# Patient Record
Sex: FEMALE | Race: WHITE | Employment: FULL TIME | URBAN - METROPOLITAN AREA
[De-identification: names, ages, dates, MRNs, and addresses within clinical notes are randomized per-mention and may not be internally consistent; named-entity substitution may affect disease eponyms.]

---

## 2023-01-19 ENCOUNTER — OFFICE VISIT (OUTPATIENT)
Dept: PRIMARY CARE CLINIC | Age: 33
End: 2023-01-19
Payer: COMMERCIAL

## 2023-01-19 VITALS
DIASTOLIC BLOOD PRESSURE: 72 MMHG | TEMPERATURE: 97.7 F | HEART RATE: 105 BPM | RESPIRATION RATE: 12 BRPM | OXYGEN SATURATION: 95 % | BODY MASS INDEX: 20.83 KG/M2 | WEIGHT: 113.2 LBS | HEIGHT: 62 IN | SYSTOLIC BLOOD PRESSURE: 100 MMHG

## 2023-01-19 DIAGNOSIS — G43.701 CHRONIC MIGRAINE WITHOUT AURA WITH STATUS MIGRAINOSUS, NOT INTRACTABLE: Primary | ICD-10-CM

## 2023-01-19 PROCEDURE — 99204 OFFICE O/P NEW MOD 45 MIN: CPT | Performed by: FAMILY MEDICINE

## 2023-01-19 RX ORDER — SUMATRIPTAN 100 MG/1
TABLET, FILM COATED ORAL
Qty: 9 TABLET | Refills: 3 | Status: SHIPPED | OUTPATIENT
Start: 2023-01-19

## 2023-01-19 RX ORDER — DULOXETIN HYDROCHLORIDE 30 MG/1
CAPSULE, DELAYED RELEASE ORAL
COMMUNITY
Start: 2022-11-08 | End: 2023-01-19 | Stop reason: SDUPTHER

## 2023-01-19 RX ORDER — SUMATRIPTAN 100 MG/1
TABLET, FILM COATED ORAL
COMMUNITY
Start: 2022-12-30 | End: 2023-01-19 | Stop reason: SDUPTHER

## 2023-01-19 RX ORDER — INDOMETHACIN 50 MG/1
CAPSULE ORAL
Qty: 30 CAPSULE | Refills: 0 | Status: SHIPPED | OUTPATIENT
Start: 2023-01-19

## 2023-01-19 RX ORDER — DULOXETIN HYDROCHLORIDE 30 MG/1
30 CAPSULE, DELAYED RELEASE ORAL DAILY
Qty: 90 CAPSULE | Refills: 1 | Status: SHIPPED | OUTPATIENT
Start: 2023-01-19

## 2023-01-19 RX ORDER — TOPIRAMATE 100 MG/1
TABLET, FILM COATED ORAL
Qty: 180 TABLET | Refills: 1 | Status: SHIPPED | OUTPATIENT
Start: 2023-01-19

## 2023-01-19 RX ORDER — TOPIRAMATE 100 MG/1
TABLET, FILM COATED ORAL
COMMUNITY
Start: 2022-12-23 | End: 2023-01-19 | Stop reason: SDUPTHER

## 2023-01-19 SDOH — ECONOMIC STABILITY: FOOD INSECURITY: WITHIN THE PAST 12 MONTHS, YOU WORRIED THAT YOUR FOOD WOULD RUN OUT BEFORE YOU GOT MONEY TO BUY MORE.: NEVER TRUE

## 2023-01-19 SDOH — ECONOMIC STABILITY: FOOD INSECURITY: WITHIN THE PAST 12 MONTHS, THE FOOD YOU BOUGHT JUST DIDN'T LAST AND YOU DIDN'T HAVE MONEY TO GET MORE.: NEVER TRUE

## 2023-01-19 ASSESSMENT — PATIENT HEALTH QUESTIONNAIRE - PHQ9
1. LITTLE INTEREST OR PLEASURE IN DOING THINGS: 0
SUM OF ALL RESPONSES TO PHQ QUESTIONS 1-9: 0
SUM OF ALL RESPONSES TO PHQ9 QUESTIONS 1 & 2: 0
SUM OF ALL RESPONSES TO PHQ QUESTIONS 1-9: 0
2. FEELING DOWN, DEPRESSED OR HOPELESS: 0
SUM OF ALL RESPONSES TO PHQ QUESTIONS 1-9: 0
SUM OF ALL RESPONSES TO PHQ QUESTIONS 1-9: 0

## 2023-01-19 ASSESSMENT — SOCIAL DETERMINANTS OF HEALTH (SDOH): HOW HARD IS IT FOR YOU TO PAY FOR THE VERY BASICS LIKE FOOD, HOUSING, MEDICAL CARE, AND HEATING?: NOT HARD AT ALL

## 2023-01-19 NOTE — PATIENT INSTRUCTIONS
Emgality is the name of the preventative to discuss with neurology      Indocin was sent to your pharmacy to use if you have a severe lasting headache

## 2023-01-19 NOTE — PROGRESS NOTES
PROGRESS NOTE  Date of Service:  1/19/2023    SUBJECTIVE:  Patient ID: Laz Mao is a 34 y.o. female    HPI:     Migraine- approx 13-14 years ago began having headaches  Has been on current medication regimen for past 10 years  With change had side effects or issues  This regimen has worked best over others but continues to have daily headache  Saw neurology many years ago' no injectiblee medications    Has made diary, supportive measures  Multiple er visits over the years      No aura   But notes sensation that severe headache will occur that day   Located at side behind eye  + nausea  Light can aggravate more than sound    Can have headache for 3 days  Imitrex helps but does not always resolve        Past Surgical History:   Procedure Laterality Date    DENTAL SURGERY      WISDOM TOOTH EXTRACTION        Social History     Tobacco Use    Smoking status: Never    Smokeless tobacco: Never   Substance Use Topics    Alcohol use: Never      Family History   Problem Relation Age of Onset    Other Mother     No Known Problems Father     No Known Problems Brother      No current outpatient medications on file prior to visit. No current facility-administered medications on file prior to visit. No Known Allergies         OBJECTIVE:  Vitals:    01/19/23 1538   BP: 100/72   Site: Right Upper Arm   Position: Sitting   Cuff Size: Medium Adult   Pulse: (!) 105   Resp: 12   Temp: 97.7 °F (36.5 °C)   TempSrc: Temporal   SpO2: 95%   Weight: 113 lb 3.2 oz (51.3 kg)   Height: 5' 2\" (1.575 m)      Body mass index is 20.7 kg/m². Physical Exam  Constitutional:       Appearance: Normal appearance. HENT:      Head: Normocephalic and atraumatic. Eyes:      General: No scleral icterus. Extraocular Movements: Extraocular movements intact. Conjunctiva/sclera: Conjunctivae normal.      Pupils: Pupils are equal, round, and reactive to light.    Cardiovascular:      Rate and Rhythm: Normal rate and regular rhythm. Heart sounds: Normal heart sounds. Neurological:      General: No focal deficit present. Mental Status: She is alert and oriented to person, place, and time. Cranial Nerves: No cranial nerve deficit. Motor: No weakness. Psychiatric:         Attention and Perception: Attention and perception normal.         Mood and Affect: Mood and affect normal.         Speech: Speech normal.         Behavior: Behavior normal. Behavior is cooperative. Thought Content: Thought content normal.         Cognition and Memory: Cognition and memory normal.         Judgment: Judgment normal.       ASSESSMENT:  1. Chronic migraine without aura with status migrainosus, not intractable         PLAN:   1. Chronic migraine without aura with status migrainosus, not intractable  -     SUMAtriptan (IMITREX) 100 MG tablet; Take 1 tablet by mouth At onset of headache. May repeat 2 hours later. Max 200 mg in 24 hours, Disp-9 tablet, R-3Normal  -     DULoxetine (CYMBALTA) 30 MG extended release capsule; Take 1 capsule by mouth daily, Disp-90 capsule, R-1Normal  -     topiramate (TOPAMAX) 100 MG tablet; TAKE 1 TABLET BY MOUTH TWICE A DAY, Disp-180 tablet, R-1Normal  -     [facility] - Laila Reinoso DO, Neurology ([facility], [facility]), [place]   Discussed other medication options   Do recommend consider change as does not have current control  Rx sent today without change - however if it will be a long time before appointment with neuro could consider other options  to try then   Indocin for intractable headache          Approximately 45 minutes of time were spent in preparation, direct patient contact, care coordination, documentation and activities otherwise related to this encounter. Electronically signed by Francisco Maynard MD on 1/19/2023 at 3:39 PM.     Please note this chart was generated using dragon dictation software.   Although every effort was made to ensure the accuracy of this automated transcription, some errors in transcription may have occurred.

## 2023-12-27 DIAGNOSIS — G43.701 CHRONIC MIGRAINE WITHOUT AURA WITH STATUS MIGRAINOSUS, NOT INTRACTABLE: ICD-10-CM

## 2023-12-27 RX ORDER — DULOXETIN HYDROCHLORIDE 30 MG/1
90 CAPSULE, DELAYED RELEASE ORAL DAILY
Qty: 270 CAPSULE | Refills: 1 | OUTPATIENT
Start: 2023-12-27

## 2023-12-27 NOTE — TELEPHONE ENCOUNTER
Patient is due for a follow up appointment. Please call to schedule  Please let me know if they need a short rx to get them to their appt.

## 2023-12-27 NOTE — TELEPHONE ENCOUNTER
Lvm to call back and schedule follow up appointment and to let us know if we need to send in a shorter rx to get her to her future appointment.

## 2023-12-27 NOTE — TELEPHONE ENCOUNTER
Medication:   Requested Prescriptions     Pending Prescriptions Disp Refills    DULoxetine (CYMBALTA) 30 MG extended release capsule [Pharmacy Med Name: DULOXETINE HCL DR 30 MG CAP] 270 capsule 1     Sig: TAKE 3 CAPSULES BY MOUTH EVERY DAY        Last Filled:  6/16/2023  #270  1 refill    Patient Phone Number: [phone] (home)     Last appt: 6/13/2023   Next appt: 1/8/2024    Last OARRS:        No data to display

## 2024-01-02 RX ORDER — DULOXETIN HYDROCHLORIDE 30 MG/1
90 CAPSULE, DELAYED RELEASE ORAL DAILY
Qty: 90 CAPSULE | Refills: 0 | Status: SHIPPED | OUTPATIENT
Start: 2024-01-02

## 2024-01-08 ENCOUNTER — OFFICE VISIT (OUTPATIENT)
Dept: PRIMARY CARE CLINIC | Age: 34
End: 2024-01-08
Payer: COMMERCIAL

## 2024-01-08 VITALS
WEIGHT: 112.8 LBS | BODY MASS INDEX: 20.76 KG/M2 | RESPIRATION RATE: 16 BRPM | OXYGEN SATURATION: 100 % | TEMPERATURE: 97.9 F | SYSTOLIC BLOOD PRESSURE: 122 MMHG | HEART RATE: 99 BPM | HEIGHT: 62 IN | DIASTOLIC BLOOD PRESSURE: 86 MMHG

## 2024-01-08 DIAGNOSIS — G43.701 CHRONIC MIGRAINE WITHOUT AURA WITH STATUS MIGRAINOSUS, NOT INTRACTABLE: ICD-10-CM

## 2024-01-08 PROCEDURE — G8427 DOCREV CUR MEDS BY ELIG CLIN: HCPCS | Performed by: FAMILY MEDICINE

## 2024-01-08 PROCEDURE — 99213 OFFICE O/P EST LOW 20 MIN: CPT | Performed by: FAMILY MEDICINE

## 2024-01-08 PROCEDURE — G8420 CALC BMI NORM PARAMETERS: HCPCS | Performed by: FAMILY MEDICINE

## 2024-01-08 PROCEDURE — 1036F TOBACCO NON-USER: CPT | Performed by: FAMILY MEDICINE

## 2024-01-08 PROCEDURE — G8484 FLU IMMUNIZE NO ADMIN: HCPCS | Performed by: FAMILY MEDICINE

## 2024-01-08 RX ORDER — TOPIRAMATE 100 MG/1
TABLET, FILM COATED ORAL
Qty: 180 TABLET | Refills: 3 | Status: SHIPPED | OUTPATIENT
Start: 2024-01-08

## 2024-01-08 RX ORDER — INDOMETHACIN 50 MG/1
CAPSULE ORAL
Qty: 30 CAPSULE | Refills: 1 | Status: SHIPPED | OUTPATIENT
Start: 2024-01-08

## 2024-01-08 RX ORDER — SUMATRIPTAN 100 MG/1
TABLET, FILM COATED ORAL
Qty: 9 TABLET | Refills: 5 | Status: SHIPPED | OUTPATIENT
Start: 2024-01-08

## 2024-01-08 RX ORDER — DULOXETIN HYDROCHLORIDE 60 MG/1
60 CAPSULE, DELAYED RELEASE ORAL DAILY
Qty: 90 CAPSULE | Refills: 3 | Status: SHIPPED | OUTPATIENT
Start: 2024-01-08

## 2024-01-08 ASSESSMENT — PATIENT HEALTH QUESTIONNAIRE - PHQ9
SUM OF ALL RESPONSES TO PHQ QUESTIONS 1-9: 0
SUM OF ALL RESPONSES TO PHQ QUESTIONS 1-9: 0
1. LITTLE INTEREST OR PLEASURE IN DOING THINGS: 0
SUM OF ALL RESPONSES TO PHQ QUESTIONS 1-9: 0
2. FEELING DOWN, DEPRESSED OR HOPELESS: 0
SUM OF ALL RESPONSES TO PHQ9 QUESTIONS 1 & 2: 0
SUM OF ALL RESPONSES TO PHQ QUESTIONS 1-9: 0

## 2024-01-08 NOTE — PROGRESS NOTES
Mental Status: She is alert and oriented to person, place, and time.   Psychiatric:         Attention and Perception: Attention and perception normal.         Mood and Affect: Mood and affect normal.         Speech: Speech normal.         Behavior: Behavior normal. Behavior is cooperative.         Thought Content: Thought content normal.         Cognition and Memory: Cognition and memory normal.         Judgment: Judgment normal.             Electronically signed by MATTEO ARREOLA MD on 1/8/2024 at 12:43 PM.    Please note this chart was generated using dragon dictation software.  Although every effort was made to ensure the accuracy of this automated transcription, some errors in transcription may have occurred.

## 2024-02-28 DIAGNOSIS — G43.701 CHRONIC MIGRAINE WITHOUT AURA WITH STATUS MIGRAINOSUS, NOT INTRACTABLE: ICD-10-CM

## 2024-02-29 RX ORDER — DULOXETIN HYDROCHLORIDE 30 MG/1
90 CAPSULE, DELAYED RELEASE ORAL DAILY
Qty: 270 CAPSULE | Refills: 1 | OUTPATIENT
Start: 2024-02-29

## 2024-02-29 NOTE — TELEPHONE ENCOUNTER
DULoxetine (CYMBALTA) 30 MG extended release capsule     The original prescription was discontinued on 1/2/2024 by Valery Masters MD for the following reason: REORDER. Renewing this prescription may not be appropriate.         Medication:   Requested Prescriptions     Pending Prescriptions Disp Refills    DULoxetine (CYMBALTA) 30 MG extended release capsule [Pharmacy Med Name: DULOXETINE HCL DR 30 MG CAP] 270 capsule 1     Sig: TAKE 3 CAPSULES BY MOUTH EVERY DAY        Last Filled:    Request denied. Sent 1/8/24 #90 w/ 3 refills     Patient Phone Number: [phone] (home)     Last appt: 1/8/2024   Next appt: Visit date not found    Last OARRS:        No data to display

## 2024-03-07 ENCOUNTER — OFFICE VISIT (OUTPATIENT)
Dept: PRIMARY CARE CLINIC | Age: 34
End: 2024-03-07
Payer: COMMERCIAL

## 2024-03-07 VITALS
BODY MASS INDEX: 19.65 KG/M2 | HEIGHT: 62 IN | SYSTOLIC BLOOD PRESSURE: 104 MMHG | RESPIRATION RATE: 18 BRPM | HEART RATE: 103 BPM | WEIGHT: 106.8 LBS | DIASTOLIC BLOOD PRESSURE: 86 MMHG | OXYGEN SATURATION: 99 % | TEMPERATURE: 97.5 F

## 2024-03-07 DIAGNOSIS — B37.31 YEAST VAGINITIS: ICD-10-CM

## 2024-03-07 DIAGNOSIS — R30.0 DYSURIA: Primary | ICD-10-CM

## 2024-03-07 LAB
BILIRUBIN, POC: NORMAL
BLOOD URINE, POC: NORMAL
CLARITY, POC: CLEAR
COLOR, POC: YELLOW
GLUCOSE URINE, POC: NORMAL
KETONES, POC: NORMAL
LEUKOCYTE EST, POC: NORMAL
NITRITE, POC: NORMAL
PH, POC: 6.5
PROTEIN, POC: NORMAL
SPECIFIC GRAVITY, POC: 1.01
UROBILINOGEN, POC: 0.2

## 2024-03-07 PROCEDURE — 1036F TOBACCO NON-USER: CPT | Performed by: FAMILY MEDICINE

## 2024-03-07 PROCEDURE — G8427 DOCREV CUR MEDS BY ELIG CLIN: HCPCS | Performed by: FAMILY MEDICINE

## 2024-03-07 PROCEDURE — G8484 FLU IMMUNIZE NO ADMIN: HCPCS | Performed by: FAMILY MEDICINE

## 2024-03-07 PROCEDURE — 81002 URINALYSIS NONAUTO W/O SCOPE: CPT | Performed by: FAMILY MEDICINE

## 2024-03-07 PROCEDURE — 99213 OFFICE O/P EST LOW 20 MIN: CPT | Performed by: FAMILY MEDICINE

## 2024-03-07 PROCEDURE — G8420 CALC BMI NORM PARAMETERS: HCPCS | Performed by: FAMILY MEDICINE

## 2024-03-07 RX ORDER — FLUCONAZOLE 150 MG/1
TABLET ORAL
Qty: 2 TABLET | Refills: 0 | Status: SHIPPED | OUTPATIENT
Start: 2024-03-07

## 2024-03-07 NOTE — PROGRESS NOTES
PROGRESS NOTE  Date of Service:  3/7/2024    SUBJECTIVE:  Patient ID: Yarely Pizano is a 33 y.o. female    ASSESSMENT  1. Dysuria    2. Yeast vaginitis        PLAN:   1. Dysuria  -     POCT Urinalysis no Micro  -     Culture, Urine  2. Yeast vaginitis  -     fluconazole (DIFLUCAN) 150 MG tablet; Take 1 tablet by mouth now and repeat in 7 days, Disp-2 tablet, R-0Normal  UA negative in office will send a culture to ensure no infection in if an infection is positive would treat with antibiotics accordingly  Recommend Diflucan today as well as topical Monistat treatment repeat Diflucan in 1 week if still having symptoms      Return if symptoms worsen or fail to improve.          HPI:     4 days ago she had onset of burning with urination.  Initially began with only burning while urinating but has progressed to burning in the skin area at a constant level  She reports no increased frequency  Some urgency  No nausea or vomiting  No fevers chills  No abdominal pain no back pain  She did try Azo stat over-the-counter which did not improve symptoms  She has noted vulvar dryness but no discharge          Patient's medications, allergies, past medical, surgical, social and family histories were reviewed and updated as appropriate.         OBJECTIVE:  Vitals:    03/07/24 1117   BP: 104/86   Site: Left Upper Arm   Position: Sitting   Cuff Size: Medium Adult   Pulse: (!) 103   Resp: 18   Temp: 97.5 °F (36.4 °C)   SpO2: 99%   Weight: 48.4 kg (106 lb 12.8 oz)   Height: 1.58 m (5' 2.2\")      Body mass index is 19.41 kg/m².   Physical Exam  Constitutional:       Appearance: Normal appearance.   HENT:      Head: Normocephalic and atraumatic.   Abdominal:      Tenderness: There is no abdominal tenderness. There is no right CVA tenderness, left CVA tenderness, guarding or rebound.   Genitourinary:     Comments: Erythema at introitus extending to labia minora  Neurological:      General: No focal deficit present.      Mental

## 2024-03-08 LAB — BACTERIA UR CULT: NORMAL

## 2024-03-21 ENCOUNTER — OFFICE VISIT (OUTPATIENT)
Dept: PRIMARY CARE CLINIC | Age: 34
End: 2024-03-21
Payer: COMMERCIAL

## 2024-03-21 VITALS
WEIGHT: 111.4 LBS | DIASTOLIC BLOOD PRESSURE: 78 MMHG | RESPIRATION RATE: 16 BRPM | TEMPERATURE: 97.7 F | BODY MASS INDEX: 20.24 KG/M2 | HEART RATE: 97 BPM | SYSTOLIC BLOOD PRESSURE: 98 MMHG | OXYGEN SATURATION: 100 %

## 2024-03-21 DIAGNOSIS — N94.9 VAGINAL BURNING: Primary | ICD-10-CM

## 2024-03-21 PROCEDURE — 99213 OFFICE O/P EST LOW 20 MIN: CPT | Performed by: FAMILY MEDICINE

## 2024-03-21 PROCEDURE — G8484 FLU IMMUNIZE NO ADMIN: HCPCS | Performed by: FAMILY MEDICINE

## 2024-03-21 PROCEDURE — 1036F TOBACCO NON-USER: CPT | Performed by: FAMILY MEDICINE

## 2024-03-21 PROCEDURE — G8420 CALC BMI NORM PARAMETERS: HCPCS | Performed by: FAMILY MEDICINE

## 2024-03-21 PROCEDURE — G8427 DOCREV CUR MEDS BY ELIG CLIN: HCPCS | Performed by: FAMILY MEDICINE

## 2024-03-21 ASSESSMENT — PATIENT HEALTH QUESTIONNAIRE - PHQ9
SUM OF ALL RESPONSES TO PHQ9 QUESTIONS 1 & 2: 0
1. LITTLE INTEREST OR PLEASURE IN DOING THINGS: NOT AT ALL
SUM OF ALL RESPONSES TO PHQ QUESTIONS 1-9: 0
2. FEELING DOWN, DEPRESSED OR HOPELESS: NOT AT ALL
SUM OF ALL RESPONSES TO PHQ QUESTIONS 1-9: 0

## 2024-03-21 NOTE — PROGRESS NOTES
PROGRESS NOTE  Date of Service:  3/21/2024    SUBJECTIVE:  Patient ID: Yarely Pizano is a 33 y.o. female    ASSESSMENT  1. Vaginal burning        PLAN:   1. Vaginal burning  -     VAGINAL PATHOGENS PROBE *A  Has just completed use of topical antifungal.  Await vaginal probe.  Consider bacterial vaginosis as cause.  Will begin appropriate treatment once results return.  Recommend barrier cream at this area for short-term symptom improvement    Return for with testing results.          HPI:   Evaluated 3/7/24  Completed 2 doses of diflucan  Completed monsitat 3 days and completed 2 rounds      Notes burning sensation all the time  Itching is happening  No vaginal discharge    No abdominal pain  Some back pain- no change        Patient's medications, allergies, past medical, surgical, social and family histories were reviewed and updated as appropriate.         OBJECTIVE:  Vitals:    03/21/24 0937   BP: 98/78   Site: Left Upper Arm   Position: Sitting   Cuff Size: Medium Adult   Pulse: 97   Resp: 16   Temp: 97.7 °F (36.5 °C)   SpO2: 100%   Weight: 50.5 kg (111 lb 6.4 oz)      Body mass index is 20.24 kg/m².   Physical Exam  Genitourinary:     Comments: Erythema is located at the posterior aspect of the vaginal introitus.  No vesicular lesions there is clear vaginal discharge            Electronically signed by MATTEO ARREOLA MD on 3/21/2024 at 10:38 AM.    Please note this chart was generated using dragon dictation software.  Although every effort was made to ensure the accuracy of this automated transcription, some errors in transcription may have occurred.

## 2024-03-22 DIAGNOSIS — B96.89 BACTERIAL VAGINOSIS: Primary | ICD-10-CM

## 2024-03-22 DIAGNOSIS — N76.0 BACTERIAL VAGINOSIS: Primary | ICD-10-CM

## 2024-03-22 LAB
CANDIDA DNA VAG QL NAA+PROBE: ABNORMAL
G VAGINALIS DNA SPEC QL NAA+PROBE: ABNORMAL
T VAGINALIS DNA VAG QL NAA+PROBE: ABNORMAL

## 2024-03-22 RX ORDER — METRONIDAZOLE 7.5 MG/G
1 GEL VAGINAL DAILY
Qty: 70 G | Refills: 0 | Status: SHIPPED | OUTPATIENT
Start: 2024-03-22 | End: 2024-03-27

## 2024-07-10 DIAGNOSIS — G43.701 CHRONIC MIGRAINE WITHOUT AURA WITH STATUS MIGRAINOSUS, NOT INTRACTABLE: ICD-10-CM

## 2024-07-10 RX ORDER — DULOXETIN HYDROCHLORIDE 30 MG/1
90 CAPSULE, DELAYED RELEASE ORAL DAILY
Qty: 90 CAPSULE | Refills: 0 | OUTPATIENT
Start: 2024-07-10

## 2024-08-16 ENCOUNTER — OFFICE VISIT (OUTPATIENT)
Dept: PRIMARY CARE CLINIC | Age: 34
End: 2024-08-16
Payer: COMMERCIAL

## 2024-08-16 VITALS
OXYGEN SATURATION: 99 % | WEIGHT: 107.6 LBS | HEART RATE: 110 BPM | BODY MASS INDEX: 19.55 KG/M2 | TEMPERATURE: 98.3 F | DIASTOLIC BLOOD PRESSURE: 78 MMHG | RESPIRATION RATE: 16 BRPM | SYSTOLIC BLOOD PRESSURE: 92 MMHG

## 2024-08-16 DIAGNOSIS — J34.89 NASAL LESION: Primary | ICD-10-CM

## 2024-08-16 PROCEDURE — 99213 OFFICE O/P EST LOW 20 MIN: CPT | Performed by: FAMILY MEDICINE

## 2024-08-16 SDOH — ECONOMIC STABILITY: INCOME INSECURITY: HOW HARD IS IT FOR YOU TO PAY FOR THE VERY BASICS LIKE FOOD, HOUSING, MEDICAL CARE, AND HEATING?: NOT HARD AT ALL

## 2024-08-16 SDOH — ECONOMIC STABILITY: FOOD INSECURITY: WITHIN THE PAST 12 MONTHS, THE FOOD YOU BOUGHT JUST DIDN'T LAST AND YOU DIDN'T HAVE MONEY TO GET MORE.: NEVER TRUE

## 2024-08-16 SDOH — ECONOMIC STABILITY: FOOD INSECURITY: WITHIN THE PAST 12 MONTHS, YOU WORRIED THAT YOUR FOOD WOULD RUN OUT BEFORE YOU GOT MONEY TO BUY MORE.: NEVER TRUE

## 2024-08-16 NOTE — PROGRESS NOTES
cooperative.         Thought Content: Thought content normal.         Cognition and Memory: Cognition and memory normal.         Judgment: Judgment normal.             Electronically signed by MATTEO ARREOLA MD on 8/16/2024 at 7:50 PM.    Please note this chart was generated using dragon dictation software.  Although every effort was made to ensure the accuracy of this automated transcription, some errors in transcription may have occurred.

## 2024-10-07 ENCOUNTER — OFFICE VISIT (OUTPATIENT)
Dept: PRIMARY CARE CLINIC | Age: 34
End: 2024-10-07
Payer: COMMERCIAL

## 2024-10-07 VITALS
HEART RATE: 93 BPM | BODY MASS INDEX: 19.3 KG/M2 | DIASTOLIC BLOOD PRESSURE: 88 MMHG | TEMPERATURE: 98.2 F | SYSTOLIC BLOOD PRESSURE: 116 MMHG | OXYGEN SATURATION: 99 % | RESPIRATION RATE: 14 BRPM | WEIGHT: 106.2 LBS

## 2024-10-07 DIAGNOSIS — B37.31 YEAST VAGINITIS: ICD-10-CM

## 2024-10-07 DIAGNOSIS — N30.00 ACUTE CYSTITIS WITHOUT HEMATURIA: Primary | ICD-10-CM

## 2024-10-07 PROCEDURE — 99213 OFFICE O/P EST LOW 20 MIN: CPT | Performed by: FAMILY MEDICINE

## 2024-10-07 RX ORDER — FLUCONAZOLE 150 MG/1
TABLET ORAL
Qty: 2 TABLET | Refills: 0 | Status: SHIPPED | OUTPATIENT
Start: 2024-10-07

## 2024-10-07 ASSESSMENT — PATIENT HEALTH QUESTIONNAIRE - PHQ9
SUM OF ALL RESPONSES TO PHQ QUESTIONS 1-9: 0
1. LITTLE INTEREST OR PLEASURE IN DOING THINGS: NOT AT ALL
SUM OF ALL RESPONSES TO PHQ9 QUESTIONS 1 & 2: 0

## 2024-10-07 NOTE — PROGRESS NOTES
PROGRESS NOTE  Date of Service:  10/7/2024    SUBJECTIVE:  Patient ID: Yarely Pizano is a 33 y.o. female    ASSESSMENT  1. Acute cystitis without hematuria    2. Yeast vaginitis        PLAN:   1. Acute cystitis without hematuria  2. Yeast vaginitis  -     fluconazole (DIFLUCAN) 150 MG tablet; Take 1 tablet by mouth now and repeat in 7 days, Disp-2 tablet, R-0Normal     She completed a urine culture on Friday and has been on Bactrim since that time.  Recommend call for this result and ensure she is on the correct antibiotic regimen  Discussed possible etiologies of her pain including of this antibiotic is not effective for this UTI  If she is having a yeast infection on top of the UTI secondary to the antibiotics  The cramping can be bladder spasm but could also be uterine menstrual cramps  Recommend trial of Pyridium for possible improvement in bladder spasms as well may use anti-inflammatories for menstrual cramps  Do increase fluids overall  To use the antifungal 1 today and repeat once completed the antibiotic regimen  Return if symptoms worsen or fail to improve.          HPI:     She was in her usual state of health until approximately 3 to 4 days ago when she had the onset of dysuria  On Friday she went to local urgent care and was told that her in office urinalysis showed extensive white blood cells she was started on Bactrim  They did send a culture and await results  Since that time she has had continued burning sensation in the vulvar area  No back pain  No fevers or chills  No blood in her urine  She does tend to get yeast infections with antibiotics  She has had some cramping but she is also on her menstrual period and this can be similar type pain  She typically uses heat which has helped recently       Patient's medications, allergies, past medical, surgical, social and family histories were reviewed and updated as appropriate.     OBJECTIVE:  Vitals:    10/07/24 1227   BP: 116/88   Site:

## 2025-01-11 DIAGNOSIS — G43.701 CHRONIC MIGRAINE WITHOUT AURA WITH STATUS MIGRAINOSUS, NOT INTRACTABLE: ICD-10-CM

## 2025-01-13 RX ORDER — DULOXETIN HYDROCHLORIDE 60 MG/1
CAPSULE, DELAYED RELEASE ORAL DAILY
Qty: 90 CAPSULE | Refills: 3 | OUTPATIENT
Start: 2025-01-13

## 2025-01-13 NOTE — TELEPHONE ENCOUNTER
LM Requesting patient to call back to schedule her annual PE per LS  Medication:   Requested Prescriptions     Pending Prescriptions Disp Refills    DULoxetine (CYMBALTA) 60 MG extended release capsule [Pharmacy Med Name: DULOXETINE HCL DR 60 MG CAP] 90 capsule 3     Sig: TAKE 1 CAPSULE BY MOUTH EVERY DAY        Last Filled: [id number] #90 x 3     Patient Phone Number: [phone] (home)     Last appt: 10/7/2024   Next appt:   Return in about 1 year (around 1/8/2025) for annual physical with fasting labs.    Last OARRS:        No data to display

## 2025-01-14 ASSESSMENT — PATIENT HEALTH QUESTIONNAIRE - PHQ9
1. LITTLE INTEREST OR PLEASURE IN DOING THINGS: NOT AT ALL
SUM OF ALL RESPONSES TO PHQ9 QUESTIONS 1 & 2: 0
2. FEELING DOWN, DEPRESSED OR HOPELESS: NOT AT ALL
SUM OF ALL RESPONSES TO PHQ QUESTIONS 1-9: 0
2. FEELING DOWN, DEPRESSED OR HOPELESS: NOT AT ALL
SUM OF ALL RESPONSES TO PHQ9 QUESTIONS 1 & 2: 0
SUM OF ALL RESPONSES TO PHQ QUESTIONS 1-9: 0
1. LITTLE INTEREST OR PLEASURE IN DOING THINGS: NOT AT ALL

## 2025-01-14 NOTE — TELEPHONE ENCOUNTER
[phone] ([place])   [facility] for return call, please give message from provider, thank you  Sent scheduling ticket via [employer]

## 2025-01-17 ENCOUNTER — OFFICE VISIT (OUTPATIENT)
Dept: PRIMARY CARE CLINIC | Age: 35
End: 2025-01-17

## 2025-01-17 VITALS
OXYGEN SATURATION: 98 % | HEART RATE: 93 BPM | DIASTOLIC BLOOD PRESSURE: 88 MMHG | BODY MASS INDEX: 19.05 KG/M2 | WEIGHT: 104.8 LBS | SYSTOLIC BLOOD PRESSURE: 102 MMHG | TEMPERATURE: 97.7 F

## 2025-01-17 DIAGNOSIS — Z00.00 EXAMINATION, MEDICAL, GENERAL: Primary | ICD-10-CM

## 2025-01-17 DIAGNOSIS — F41.9 ANXIETY: ICD-10-CM

## 2025-01-17 DIAGNOSIS — G43.701 CHRONIC MIGRAINE WITHOUT AURA WITH STATUS MIGRAINOSUS, NOT INTRACTABLE: ICD-10-CM

## 2025-01-17 RX ORDER — TOPIRAMATE 100 MG/1
TABLET, FILM COATED ORAL
Qty: 180 TABLET | Refills: 3 | Status: SHIPPED | OUTPATIENT
Start: 2025-01-17

## 2025-01-17 RX ORDER — DULOXETIN HYDROCHLORIDE 60 MG/1
60 CAPSULE, DELAYED RELEASE ORAL DAILY
Qty: 90 CAPSULE | Refills: 3 | Status: SHIPPED | OUTPATIENT
Start: 2025-01-17

## 2025-01-17 RX ORDER — INDOMETHACIN 50 MG/1
CAPSULE ORAL
Qty: 30 CAPSULE | Refills: 1 | Status: SHIPPED | OUTPATIENT
Start: 2025-01-17

## 2025-01-17 RX ORDER — SUMATRIPTAN SUCCINATE 100 MG/1
TABLET ORAL
Qty: 9 TABLET | Refills: 5 | Status: SHIPPED | OUTPATIENT
Start: 2025-01-17

## 2025-01-17 SDOH — ECONOMIC STABILITY: FOOD INSECURITY: WITHIN THE PAST 12 MONTHS, THE FOOD YOU BOUGHT JUST DIDN'T LAST AND YOU DIDN'T HAVE MONEY TO GET MORE.: NEVER TRUE

## 2025-01-17 SDOH — ECONOMIC STABILITY: FOOD INSECURITY: WITHIN THE PAST 12 MONTHS, YOU WORRIED THAT YOUR FOOD WOULD RUN OUT BEFORE YOU GOT MONEY TO BUY MORE.: NEVER TRUE

## 2025-01-17 NOTE — PROGRESS NOTES
1/17/2025    Yarely Pizano is a 34 y.o. female, here for a preventive medicine evaluation.    Sleep-  not enough overall,       Exercise-  walking dog, active overall,     Nutrition- healthy overall    Chronic medication management                  Migraines- does see neurology   She was able to join a med program for coverage of emgality  Has had a great reduction in her migraines overall  No longer with daily headaches  Continues topamax and cymbalta  Will use imitrex and usually helpful when migraine ioccurs, very rare need for indocin    Anxiety- stable overall, does have symptoms but feels this is manageable    Health Maintenance   Topic Date Due    Varicella vaccine (1 of 2 - 13+ 2-dose series) Never done    HIV screen  Never done    Hepatitis C screen  Never done    Hepatitis B vaccine (1 of 3 - 19+ 3-dose series) Never done    Flu vaccine (1) 08/01/2024    COVID-19 Vaccine (5 - 2023-24 season) 09/01/2024    Depression Screen  01/14/2026    DTaP/Tdap/Td vaccine (2 - Td or Tdap) 01/10/2028    Cervical cancer screen  07/17/2029    Hepatitis A vaccine  Aged Out    Hib vaccine  Aged Out    HPV vaccine  Aged Out    Polio vaccine  Aged Out    Meningococcal (ACWY) vaccine  Aged Out    Pneumococcal 0-64 years Vaccine  Aged Out     Immunization History   Administered Date(s) Administered    COVID-19, PFIZER Bivalent, DO NOT Dilute, (age 12y+), IM, 30 mcg/0.3 mL 11/05/2022    COVID-19, PFIZER PURPLE top, DILUTE for use, (age 12 y+), 30mcg/0.3mL 02/19/2021, 03/12/2021, 10/30/2021    Influenza, FLUARIX, FLULAVAL, FLUZONE (age 6 mo+) and AFLURIA, (age 3 y+), Quadv PF, 0.5mL 10/24/2016, 09/20/2020, 10/30/2021, 11/05/2022    TDaP, ADACEL (age 10y-64y), BOOSTRIX (age 10y+), IM, 0.5mL 01/10/2018     The ASCVD Risk score (Brittany DK, et al., 2019) failed to calculate for the following reasons:    The 2019 ASCVD risk score is only valid for ages 40 to 79    No Known Allergies  Past Medical History:   Diagnosis Date

## 2025-01-17 NOTE — PATIENT INSTRUCTIONS
It is important  to think about prevention of osteopenia and osteoporosis.   Here is a list of things you can do to improve your bone health.    Lifestyle measures  Eating a balanced and nutritious diet  Daily cardiovascular exercise such as walking, biking, aerobics  Strength and resistance exercise at least 3 times each week  Avoiding or quitting smoking, if applicable  Limiting alcohol intake, if applicable    Supplements-     Calcium. You need 1200 mg of calcium each day.  You cannot absorb more than 600 mg of calcium at a time, so you have to split this into 2 doses.    You can get calcium from diet (milk has 300 mg calcium per 8 ounces, yogurt has 150 mg per serving and cheese has 100 mg per ounce, leafy green vegetables have 50 mg per cup; many foods such as cereals, granola bars and juice are supplemented with calcium.  Read labels to see how much you are getting). If you are not getting enough calcium in your diet, you can take calcium available over-the-counter.  I recommend calcium citrate - it is absorbed more easily than calcium carbonate.      Vitamin D. You need 8905-9133 IU vitamin D each day. This is an over the counter supplement.    Magnesium. You need 200-250 mg of magnesium a day. Magnesium comes in two forms from which you can choose.  Magnesium glycinate comes in 200 mg doses and can have fewer GI side effects. Magnesium oxide comes in a 250 mg dose.      Collagen peptides- 5 grams of collagen peptides each day has been shown to improve bone density.  Look for a collagen peptide supplement that contains hydrolyzed collagen types 1 and 3.  An easy to find brand is [employer] proteins but there are many others available.

## 2025-01-26 PROBLEM — F41.9 ANXIETY: Status: ACTIVE | Noted: 2025-01-26
